# Patient Record
Sex: FEMALE
[De-identification: names, ages, dates, MRNs, and addresses within clinical notes are randomized per-mention and may not be internally consistent; named-entity substitution may affect disease eponyms.]

---

## 2019-11-19 ENCOUNTER — APPOINTMENT (OUTPATIENT)
Dept: ORTHOPEDIC SURGERY | Facility: CLINIC | Age: 59
End: 2019-11-19
Payer: COMMERCIAL

## 2019-11-19 VITALS — BODY MASS INDEX: 33.13 KG/M2 | WEIGHT: 180 LBS | HEIGHT: 62 IN

## 2019-11-19 PROBLEM — Z00.00 ENCOUNTER FOR PREVENTIVE HEALTH EXAMINATION: Status: ACTIVE | Noted: 2019-11-19

## 2019-11-19 PROCEDURE — 99204 OFFICE O/P NEW MOD 45 MIN: CPT

## 2019-11-19 PROCEDURE — 73562 X-RAY EXAM OF KNEE 3: CPT | Mod: RT

## 2019-11-19 NOTE — HISTORY OF PRESENT ILLNESS
[de-identified] : Location: Right knee\par Quality: aching\par Duration: 2016 Previously treated\par Context: Atraumatic\par Aggravating Factors: Walking, bending, standing , stairs \par Conservative treatment: ICE, otc \par Associated Symptoms: Swelling, stiffness \par Prior Studies:  2016\par here with her son

## 2019-11-19 NOTE — PHYSICAL EXAM
[de-identified] : Right knee shows mild swelling there is some crepitus there is tenderness medially no instability neurovascular exam is normal [de-identified] : Right knee x-ray shows marked degenerative arthritis medially

## 2019-11-19 NOTE — DISCUSSION/SUMMARY
[de-identified] : Options for treatment discussed she'll ice and use over-the-counter medication to physical therapy if it doesn't get better she'll let me know followup will be as needed.

## 2020-08-24 ENCOUNTER — APPOINTMENT (OUTPATIENT)
Dept: ORTHOPEDIC SURGERY | Facility: CLINIC | Age: 60
End: 2020-08-24
Payer: COMMERCIAL

## 2020-08-24 ENCOUNTER — NON-APPOINTMENT (OUTPATIENT)
Age: 60
End: 2020-08-24

## 2020-08-24 VITALS — WEIGHT: 180 LBS | BODY MASS INDEX: 33.13 KG/M2 | HEIGHT: 62 IN

## 2020-08-24 VITALS — TEMPERATURE: 96.5 F

## 2020-08-24 DIAGNOSIS — M17.0 BILATERAL PRIMARY OSTEOARTHRITIS OF KNEE: ICD-10-CM

## 2020-08-24 PROCEDURE — 99213 OFFICE O/P EST LOW 20 MIN: CPT

## 2020-08-24 RX ORDER — MELOXICAM 15 MG/1
15 TABLET ORAL DAILY
Qty: 30 | Refills: 3 | Status: ACTIVE | COMMUNITY
Start: 2020-08-24 | End: 1900-01-01

## 2020-08-24 NOTE — PHYSICAL EXAM
[Normal] : Gait: normal [de-identified] : Bilateral knee shows no warmth or there is mild swelling. There is crepitus. There is joint line pain. There is no instability. Neurovascular exam is normal. Negative Yuli.

## 2020-08-24 NOTE — HISTORY OF PRESENT ILLNESS
[Pain Location] : pain [Stable] : stable [de-identified] : Location: Bilateral knees , left knee is worse\par Quality:  aching\par Duration: 2 months worsening\par Context:  atraumatic\par Aggravating Factors: walking, stairs , bending \par Conservative treatment:  otc medication , ice and heat\par Associated Symptoms: swelling , stiffness , buckling , instability \par Prior Studies: n.a \par

## 2020-08-24 NOTE — DISCUSSION/SUMMARY
[de-identified] : She was placed on anti-inflammatory tissue care time. It doesn't improve that an injection will be done followup as needed.

## 2020-12-16 ENCOUNTER — APPOINTMENT (OUTPATIENT)
Dept: ORTHOPEDIC SURGERY | Facility: CLINIC | Age: 60
End: 2020-12-16

## 2020-12-28 ENCOUNTER — APPOINTMENT (OUTPATIENT)
Dept: ORTHOPEDIC SURGERY | Facility: CLINIC | Age: 60
End: 2020-12-28
Payer: COMMERCIAL

## 2020-12-28 PROCEDURE — 20610 DRAIN/INJ JOINT/BURSA W/O US: CPT | Mod: RT

## 2020-12-28 PROCEDURE — 99213 OFFICE O/P EST LOW 20 MIN: CPT | Mod: 25

## 2020-12-28 PROCEDURE — 99072 ADDL SUPL MATRL&STAF TM PHE: CPT

## 2020-12-28 NOTE — DISCUSSION/SUMMARY
[de-identified] : Treatment options discussed. We gave her cortisone injection if there is no improvement we will try to single gel injection she was here with her son today. Followup will be as needed.

## 2020-12-28 NOTE — REVIEW OF SYSTEMS
[Arthralgia] : arthralgia [Joint Pain] : joint pain [Joint Stiffness] : no joint stiffness [Joint Swelling] : joint swelling [Negative] : Heme/Lymph

## 2020-12-28 NOTE — PHYSICAL EXAM
[Normal] : Gait: normal [de-identified] : right knee shows no warmth or there is mild swelling. There is crepitus. There is joint line pain. There is no instability. Neurovascular exam is normal. Negative Yuli.

## 2020-12-28 NOTE — HISTORY OF PRESENT ILLNESS
[de-identified] : Location: Right knee\par Quality: aching\par Duration: 2016 Previously treated\par Context: Atraumatic\par Aggravating Factors: Walking, bending, standing , stairs \par Conservative treatment: ICE, otc \par Associated Symptoms: Swelling, stiffness \par Prior Studies:  2016\par here with her son [Pain Location] : pain [Stable] : stable

## 2021-02-11 RX ORDER — HYALURONATE SODIUM, STABILIZED 88 MG/4 ML
88 SYRINGE (ML) INTRAARTICULAR
Qty: 1 | Refills: 0 | Status: ACTIVE | OUTPATIENT
Start: 2021-02-11

## 2021-03-10 RX ORDER — HYALURONATE SOD, CROSS-LINKED 30 MG/3 ML
30 SYRINGE (ML) INTRAARTICULAR
Qty: 1 | Refills: 0 | Status: ACTIVE | OUTPATIENT
Start: 2021-03-10

## 2021-05-04 ENCOUNTER — RX RENEWAL (OUTPATIENT)
Age: 61
End: 2021-05-04

## 2021-05-04 RX ORDER — DICLOFENAC SODIUM 1% 10 MG/G
1 GEL TOPICAL DAILY
Qty: 500 | Refills: 0 | Status: ACTIVE | COMMUNITY
Start: 2021-02-11 | End: 1900-01-01

## 2021-05-26 ENCOUNTER — APPOINTMENT (OUTPATIENT)
Dept: ORTHOPEDIC SURGERY | Facility: CLINIC | Age: 61
End: 2021-05-26
Payer: COMMERCIAL

## 2021-05-26 VITALS — WEIGHT: 180 LBS | BODY MASS INDEX: 33.13 KG/M2 | HEIGHT: 62 IN

## 2021-05-26 PROCEDURE — 99072 ADDL SUPL MATRL&STAF TM PHE: CPT

## 2021-05-26 PROCEDURE — 20610 DRAIN/INJ JOINT/BURSA W/O US: CPT | Mod: RT

## 2021-05-26 PROCEDURE — 99213 OFFICE O/P EST LOW 20 MIN: CPT | Mod: 25

## 2021-05-26 NOTE — DISCUSSION/SUMMARY
[de-identified] : The first Euflexxa injection was given into the right knee. Postinjection instructions given. She'll follow up in a week. Her son is with her today

## 2021-05-26 NOTE — PHYSICAL EXAM
[Normal] : Gait: normal [de-identified] : right knee shows no warmth or there is mild swelling. There is crepitus. There is joint line pain. There is no instability. Neurovascular exam is normal. Negative Yuli.

## 2021-05-26 NOTE — PROCEDURE
[de-identified] : Right knee Euflexxa injection was given after ethyl chloride. Postinjection instructions given to

## 2021-06-02 ENCOUNTER — APPOINTMENT (OUTPATIENT)
Dept: ORTHOPEDIC SURGERY | Facility: CLINIC | Age: 61
End: 2021-06-02
Payer: COMMERCIAL

## 2021-06-02 PROCEDURE — 99213 OFFICE O/P EST LOW 20 MIN: CPT | Mod: 25

## 2021-06-02 PROCEDURE — 99072 ADDL SUPL MATRL&STAF TM PHE: CPT

## 2021-06-02 PROCEDURE — 20610 DRAIN/INJ JOINT/BURSA W/O US: CPT | Mod: RT

## 2021-06-02 NOTE — PHYSICAL EXAM
[Normal] : Gait: normal [de-identified] : right knee shows no warmth or there is mild swelling. There is crepitus. There is joint line pain. There is no instability. Neurovascular exam is normal. Negative Yuli.

## 2021-06-02 NOTE — HISTORY OF PRESENT ILLNESS
[de-identified] : Location: Right knee\par Quality: aching\par Duration: 2016 Previously treated\par Context: Atraumatic\par Aggravating Factors: Walking, bending, standing , stairs \par Conservative treatment: ICE, otc \par Associated Symptoms: Swelling, stiffness \par Prior Studies:  2016\par here with her son [Pain Location] : pain [Stable] : stable

## 2021-06-02 NOTE — DISCUSSION/SUMMARY
[de-identified] : After discussion of the risks and benefits, the patient has elected to proceed with an injection. Confirmed that the patient does not have a history of prior adverse reactions, active infections are relevant allergies. There was no erythema, warmth and the skin was clear. The skin was sterilized with alcohol. Ethyl chloride was used as a topical anesthetic. A needle was inserted. The site was injected with a mixture of Kenalog and local anesthetic. The injection was completed without complication and a bandage was applied. The patient tolerated the procedure well and was given post injection instructions.\par \par Medications:Euflexxa right knee\par Follow up at the next injection in about a week to 10 days for the third.

## 2021-06-14 ENCOUNTER — APPOINTMENT (OUTPATIENT)
Dept: ORTHOPEDIC SURGERY | Facility: CLINIC | Age: 61
End: 2021-06-14
Payer: COMMERCIAL

## 2021-06-14 VITALS — BODY MASS INDEX: 33.13 KG/M2 | HEIGHT: 62 IN | WEIGHT: 180 LBS

## 2021-06-14 DIAGNOSIS — M17.11 UNILATERAL PRIMARY OSTEOARTHRITIS, RIGHT KNEE: ICD-10-CM

## 2021-06-14 PROCEDURE — 99212 OFFICE O/P EST SF 10 MIN: CPT | Mod: 25

## 2021-06-14 PROCEDURE — 20610 DRAIN/INJ JOINT/BURSA W/O US: CPT | Mod: RT

## 2021-06-14 PROCEDURE — 99072 ADDL SUPL MATRL&STAF TM PHE: CPT

## 2021-06-14 NOTE — HISTORY OF PRESENT ILLNESS
[de-identified] : She is here for followup with her son. The symptoms are about the same. She has had 2 Euflexxa injections. There is pain with walking and getting up from a seated position. [Pain Location] : pain [Stable] : stable

## 2021-06-14 NOTE — PHYSICAL EXAM
[Normal] : Gait: normal [de-identified] : right knee shows no warmth or there is mild swelling. There is crepitus. There is joint line pain. There is no instability. Neurovascular exam is normal. Negative Yuli.

## 2021-06-14 NOTE — PROCEDURE
[de-identified] : After discussion of the risks and benefits, the patient has elected to proceed with an injection. Confirmed that the patient does not have a history of prior adverse reactions, active infections are relevant allergies. There was no erythema, warmth and the skin was clear. The skin was sterilized with alcohol. Ethyl chloride was used as a topical anesthetic. A needle was inserted. The site was injected with euflexxa The injection was completed without complication and a bandage was applied. The patient tolerated the procedure well and was given post injection instructions.\par \par Medications: right knee Euflexxa injection was given laterally, postoperative instructions given.

## 2021-06-14 NOTE — DISCUSSION/SUMMARY
[de-identified] : Injection given in the right knee today postinjection instructions given. I passed the son to let me know how she is doing in 3 or 4 weeks so I can make further recommendations and suggestions.

## 2021-09-13 ENCOUNTER — APPOINTMENT (OUTPATIENT)
Dept: ORTHOPEDIC SURGERY | Facility: CLINIC | Age: 61
End: 2021-09-13
Payer: COMMERCIAL

## 2021-09-13 VITALS — WEIGHT: 180 LBS | HEIGHT: 55 IN | BODY MASS INDEX: 41.66 KG/M2

## 2021-09-13 PROCEDURE — 99214 OFFICE O/P EST MOD 30 MIN: CPT

## 2021-09-13 PROCEDURE — 73562 X-RAY EXAM OF KNEE 3: CPT | Mod: RT

## 2021-09-13 RX ORDER — NAPROXEN 500 MG/1
500 TABLET, DELAYED RELEASE ORAL
Qty: 30 | Refills: 0 | Status: ACTIVE | COMMUNITY
Start: 2021-07-14

## 2021-09-13 RX ORDER — MELOXICAM 10 MG/1
10 CAPSULE ORAL
Qty: 30 | Refills: 0 | Status: ACTIVE | COMMUNITY
Start: 2021-09-13 | End: 1900-01-01

## 2021-09-13 RX ORDER — ALBUTEROL SULFATE 90 UG/1
108 (90 BASE) INHALANT RESPIRATORY (INHALATION)
Qty: 7 | Refills: 0 | Status: ACTIVE | COMMUNITY
Start: 2021-07-17

## 2021-09-13 RX ORDER — BISOPROLOL FUMARATE AND HYDROCHLOROTHIAZIDE 5; 6.25 MG/1; MG/1
5-6.25 TABLET, FILM COATED ORAL
Qty: 90 | Refills: 0 | Status: ACTIVE | COMMUNITY
Start: 2020-04-11

## 2021-09-13 RX ORDER — BISOPROLOL FUMARATE 5 MG/1
5 TABLET, FILM COATED ORAL
Qty: 90 | Refills: 0 | Status: ACTIVE | COMMUNITY
Start: 2021-07-14

## 2021-09-13 RX ORDER — HYALURONATE SODIUM 20 MG/2 ML
20 SYRINGE (ML) INTRAARTICULAR
Qty: 6 | Refills: 0 | Status: ACTIVE | COMMUNITY
Start: 2021-05-17

## 2021-09-13 NOTE — DISCUSSION/SUMMARY
[Medication Risks Reviewed] : Medication risks reviewed [Surgical risks reviewed] : Surgical risks reviewed [de-identified] : We talked at length with the patient and her son about knee replacement surgery. I believe this is the best option for her. We used the model as well as drawings indicating the extent of the procedure we talked about the response and benefits of the operation including potential complications.  typical convalescent expectations as well as consultations and may require revision surgery 6 component loosening migration wear infection stiffness and instability.\par \par Patient was replaced in salvage to her milligrams p.o. b.i.d. for 6 day course per day thereafter as needed as a temporizing measure.

## 2021-09-13 NOTE — HISTORY OF PRESENT ILLNESS
[de-identified] : First time visit for this 61-year-old female with a complaint of chronic worsening medial right knee pain. She is referred by another orthopedic surgeon who is already engaged her in conservative measures such as cortisone injections in each injections. Patient has pain at the level she has difficulty with her responsibilities at work.

## 2021-09-13 NOTE — PHYSICAL EXAM
[de-identified] : Right knee definite medial joint line tenderness range of motion is 5 -110°. He is stable to AP stress varus valgus stress is neurovascularly intact distally. Soft tissue swelling is noted no effusion quad tone is good. [de-identified] : AP standing individual lateral side attention complete obliteration of joint space on standing a projection of moderate arthritic changes in both the patellofemoral and lateral compartment

## 2021-09-13 NOTE — REASON FOR VISIT
[Follow-Up Visit] : a follow-up visit for [FreeTextEntry2] : RIGHT KNEE PAIN AND STIFFNESS - SENT FOR NEW [facility]YS - EVAL FOR REPLACEMENT

## 2021-09-29 ENCOUNTER — APPOINTMENT (OUTPATIENT)
Dept: ORTHOPEDIC SURGERY | Facility: HOSPITAL | Age: 61
End: 2021-09-29
